# Patient Record
Sex: FEMALE | Race: WHITE | URBAN - METROPOLITAN AREA
[De-identification: names, ages, dates, MRNs, and addresses within clinical notes are randomized per-mention and may not be internally consistent; named-entity substitution may affect disease eponyms.]

---

## 2017-11-14 ENCOUNTER — TRANSFERRED RECORDS (OUTPATIENT)
Dept: HEALTH INFORMATION MANAGEMENT | Facility: CLINIC | Age: 52
End: 2017-11-14

## 2018-08-28 DIAGNOSIS — H91.90 HEARING LOSS: Primary | ICD-10-CM

## 2018-09-05 ENCOUNTER — TELEPHONE (OUTPATIENT)
Dept: OTOLARYNGOLOGY | Facility: CLINIC | Age: 53
End: 2018-09-05

## 2018-09-05 NOTE — TELEPHONE ENCOUNTER
Contacted [facility] medical records department and requested all records related to tinnitus and vertigo and any recent related imaging or audiograms. Provided fax number, will await records. BRITTANY Holland

## 2018-09-10 NOTE — TELEPHONE ENCOUNTER
FUTURE VISIT INFORMATION      FUTURE VISIT INFORMATION:    Date: 9/13/18    Time: 1:30PM (Audio)/ 2:15PM (ENT)     Location: [facility]  REFERRAL INFORMATION:    Referring provider:  SELF    Referring providers clinic:  SELF    Reason for visit/diagnosis  Tinnitus, difficulty hearing in crowds. Hx vertigo    RECORDS REQUESTED FROM:       Clinic name Comments Records Status Imaging Status   [facility]  /[facility] 11/21/17 & 11/14/17  notes with Dr Jeffrey Alexandre for Bilateral impacted cerumen (Primary Dx)    2/23/17 nurse triage encounter re: Dizzinesss Care Everywhere                                    RECORDS STATUS      *call patient re: additional records

## 2018-09-11 ENCOUNTER — HEALTH MAINTENANCE LETTER (OUTPATIENT)
Age: 53
End: 2018-09-11

## 2018-09-13 ENCOUNTER — OFFICE VISIT (OUTPATIENT)
Dept: AUDIOLOGY | Facility: CLINIC | Age: 53
End: 2018-09-13
Attending: OTOLARYNGOLOGY
Payer: COMMERCIAL

## 2018-09-13 ENCOUNTER — OFFICE VISIT (OUTPATIENT)
Dept: OTOLARYNGOLOGY | Facility: CLINIC | Age: 53
End: 2018-09-13
Attending: OTOLARYNGOLOGY
Payer: COMMERCIAL

## 2018-09-13 ENCOUNTER — PRE VISIT (OUTPATIENT)
Dept: OTOLARYNGOLOGY | Facility: CLINIC | Age: 53
End: 2018-09-13

## 2018-09-13 DIAGNOSIS — H93.13 TINNITUS OF BOTH EARS: ICD-10-CM

## 2018-09-13 DIAGNOSIS — H91.90 HEARING LOSS: ICD-10-CM

## 2018-09-13 DIAGNOSIS — H93.13 TINNITUS, BILATERAL: Primary | ICD-10-CM

## 2018-09-13 DIAGNOSIS — R42 DIZZINESS AND GIDDINESS: ICD-10-CM

## 2018-09-13 RX ORDER — NITROFURANTOIN 25; 75 MG/1; MG/1
CAPSULE ORAL
Refills: 0 | COMMUNITY
Start: 2018-09-11

## 2018-09-13 ASSESSMENT — PAIN SCALES - GENERAL: PAINLEVEL: NO PAIN (0)

## 2018-09-13 NOTE — NURSING NOTE
Chief Complaint   Patient presents with     Consult     Hearing Trouble     Federico Rogers, EMT

## 2018-09-13 NOTE — MR AVS SNAPSHOT
After Visit Summary   9/13/2018    Hawa Dotson    MRN: [id number]           Patient Information     Date Of Birth          1965        Visit Information        Provider Department      9/13/2018 1:30 PM Sulma Mendez AuD M [facility] Audiology        Today's Diagnoses     Hearing loss        Tinnitus of both ears        Dizziness and giddiness           Follow-ups after your visit        Your next 10 appointments already scheduled     Sep 13, 2018  1:30 PM CDT   Walk In From ENT with Agustin Cazares [facility] Audiology ([facility])    [address] [place] [id number] [phone]            Sep 13, 2018  2:15 PM CDT   (Arrive by 2:00 PM)   NEW NEUROTOLOGY VISIT with MD JANICE Mcgrath [facility] Ear Nose and Throat ([facility])    [address] [place] [id number] [phone]              Who to contact     Please call your clinic at [phone] to:    Ask questions about your health    Make or cancel appointments    Discuss your medicines    Learn about your test results    Speak to your doctor            Additional Information About Your Visit        [employer] Information     [employer] gives you secure access to your electronic health record. If you see a primary care provider, you can also send messages to your care team and make appointments. If you have questions, please call your primary care clinic.  If you do not have a primary care provider, please call [phone] and they will assist you.      [employer] is an electronic gateway that provides easy, online access to your medical records. With [employer], you can request a clinic appointment, read your test results, renew a prescription or communicate with your care team.     To access your existing account, please contact your [facility] or call [phone] for assistance.        Care EveryWhere ID     This is  your Care EveryWhere ID. This could be used by other organizations to access your [facility] medical records  [id number]         Blood Pressure from Last 3 Encounters:   No data found for BP    Weight from Last 3 Encounters:   No data found for Wt              We Performed the Following     AUDIOGRAM/TYMPANOGRAM - INTERFACE     AUDIOLOGY ADULT REFERRAL     [facility] Eval [facility] Speech Recog ([address]658)     Tymps / Reflex   ([address])        Primary Care Provider Office Phone # Fax #    Shayy Godinez [phone]       [facility] [address]  [facility] [address]        Equal Access to Services     [facility]: Hadii aad ku hadasho Soomaali, waaxda luqadaha, qaybta kaalmada adeegyada, waxay idiin hayaan adeeg trung rivers . So [facility] [phone].    ATENCIÓN: Si habla español, tiene a summers disposición servicios gratuitos de asistencia lingüística. Llame al [phone].    We comply with applicable federal civil rights laws and [place] laws. We do not discriminate on the basis of race, color, national origin, age, disability, sex, sexual orientation, or gender identity.            Thank you!     Thank you for choosing [facility] AUDIOLOGY  for your care. Our goal is always to provide you with excellent care. Hearing back from our patients is one way we can continue to improve our services. Please take a few minutes to complete the written survey that you may receive in the mail after your visit with us. Thank you!             Your Updated Medication List - Protect others around you: Learn how to safely use, store and throw away your medicines at www.disposemymeds.org.      Notice  As of 9/13/2018  1:29 PM    You have not been prescribed any medications.

## 2018-09-13 NOTE — PROGRESS NOTES
Hawa Dotson is a new patient to our clinic.  She is a 53 year old female being seen for bilateral tinnitus.  She reports that she has had tinnitus for years and the left might be louder than the right.  It is not particularly bothersome but she has a family history of hearing loss and wanted to check that she was not also having hearing loss.  She has noticed that she finds it more difficult to hear in noise.  She otherwise has no history of recurrent ear infections and currently has no otalgia or otorrhea.  She has had BPPV in the past and is not having vertigo right now but does sometimes have a little imbalance.  Nothing where she has fallen or has a difficult time doing her daily activities.    Past Medical History:   Diagnosis Date     Benign positional vertigo 2017    Changing position of head (standing and/or lying down)     Conductive hearing loss 2010?    Hereditary inner ear condition; maternal; required surgery     Hearing problem 2010    Background noise bothersome; talk loud; delayed response     Tinnitus 2010?    Constant power line hum in ears; has increased over time       Past Surgical History:   Procedure Laterality Date     GI SURGERY  2012    removal of left ovary; removal of bladder polyps     ORTHOPEDIC SURGERY  2012 & 2017    bunion surgeries; both feet     TYMPANOPLASTY      Mom had inner ear surgery; hereditary to daughters       FHx:  Her father had a BAHA for unilateral sensorineural hearing loss and her mother had middle ear surgery for conductive hearing loss.    Social History   Substance Use Topics     Smoking status: Never Smoker     Smokeless tobacco: Never Used     Alcohol use No       Patient Supplied Answers to Review of Systems  [facility] ROS 9/11/2018   Constitutional Problems with sleep   Neurology Dizzy spells   Ears, Nose, Throat Hearing loss, Ringing/noise in ears, Trouble swallowing   Gastrointestinal/Genitourinary Pain with urination   Endocrine Frequent urination   The  remainder of the 10 point review of systems is otherwise negative.    Physical examination:  Constitutional:  In no acute distress, appears stated age  Eyes:  Extraocular movements intact, no spontaneous nystagmus  Ears:  Both ears examined.  Ear canals clear, TMs intact with well aerated middle ears.  Respiratory:  No increased work of breathing, wheezing or stridor  Musculoskeletal:  Good upper extremity strength  Skin:  No rashes on the head and neck  Neurologic:  House Brackman 1/6 bilaterally, ambulating normally  Psychiatric:  Alert, normal affect, answering questions appropriately    Audiogram:  Normal hearing bilaterally with excellent speech discrimination, normal tympanograms.    Outside record was reviewed including audiogram which also showed normal hearing bilaterally.    Assessment and plan:  Bilateral tinnitus with excellent hearing bilaterally.  We went over the central etiology of tinnitus as well as masking techniques.  She was pleased that her ear exam was normal and her hearing was normal.  She is not terribly bothered by the tinnitus and had her questions answered.

## 2018-09-13 NOTE — PATIENT INSTRUCTIONS
You will need  to schedule a follow up appointment as needed.       Please call our clinic for any questions,concerns,or worsening symptoms.      Clinic #[phone]       Option 3  for the ENT Care Team.       Option 1 for scheduling.    Anusha BOSTON RNCC  [phone]

## 2018-09-13 NOTE — PROGRESS NOTES
AUDIOLOGY REPORT    SUMMARY: Audiology visit completed. See audiogram for results.      RECOMMENDATIONS: Follow-up with ENT.      Carl Allred, CCC-A  Licensed Audiologist  [place] #[id number]

## 2018-09-13 NOTE — LETTER
9/13/2018      RE: Hawa Dotson  [address] [facility] [address]       Hawa Dotson is a new patient to our clinic.  She is a 53 year old female being seen for bilateral tinnitus.  She reports that she has had tinnitus for years and the left might be louder than the right.  It is not particularly bothersome but she has a family history of hearing loss and wanted to check that she was not also having hearing loss.  She has noticed that she finds it more difficult to hear in noise.  She otherwise has no history of recurrent ear infections and currently has no otalgia or otorrhea.  She has had BPPV in the past and is not having vertigo right now but does sometimes have a little imbalance.  Nothing where she has fallen or has a difficult time doing her daily activities.    Past Medical History:   Diagnosis Date     Benign positional vertigo 2017    Changing position of head (standing and/or lying down)     Conductive hearing loss 2010?    Hereditary inner ear condition; maternal; required surgery     Hearing problem 2010    Background noise bothersome; talk loud; delayed response     Tinnitus 2010?    Constant power line hum in ears; has increased over time       Past Surgical History:   Procedure Laterality Date     GI SURGERY  2012    removal of left ovary; removal of bladder polyps     ORTHOPEDIC SURGERY  2012 & 2017    bunion surgeries; both feet     TYMPANOPLASTY      Mom had inner ear surgery; hereditary to daughters       FHx:  Her father had a BAHA for unilateral sensorineural hearing loss and her mother had middle ear surgery for conductive hearing loss.    Social History   Substance Use Topics     Smoking status: Never Smoker     Smokeless tobacco: Never Used     Alcohol use No       Physical examination:  Constitutional:  In no acute distress, appears stated age  Eyes:  Extraocular movements intact, no spontaneous nystagmus  Ears:  Both ears examined.  Ear canals clear, TMs intact with well aerated  middle ears.  Respiratory:  No increased work of breathing, wheezing or stridor  Musculoskeletal:  Good upper extremity strength  Skin:  No rashes on the head and neck  Neurologic:  House Brackman 1/6 bilaterally, ambulating normally  Psychiatric:  Alert, normal affect, answering questions appropriately    Audiogram:  Normal hearing bilaterally with excellent speech discrimination, normal tympanograms.    Outside record was reviewed including audiogram which also showed normal hearing bilaterally.    Assessment and plan:  Bilateral tinnitus with excellent hearing bilaterally.  We went over the central etiology of tinnitus as well as masking techniques.  She was pleased that her ear exam was normal and her hearing was normal.  She is not terribly bothered by the tinnitus and had her questions answered.    Carolina Stein MD

## 2018-09-13 NOTE — MR AVS SNAPSHOT
After Visit Summary   9/13/2018    Hawa Dotson    MRN: [id number]           Patient Information     Date Of Birth          1965        Visit Information        Provider Department      9/13/2018 2:15 PM Carolina Stein MD [facility] Ear Nose and Throat        Today's Diagnoses     Tinnitus, bilateral    -  1      Care Instructions    You will need  to schedule a follow up appointment as needed.       Please call our clinic for any questions,concerns,or worsening symptoms.      Clinic #[phone]       Option 3  for the ENT Care Team.       Option 1 for scheduling.    Anusha BOSTON RNCC  [phone]            Follow-ups after your visit        Follow-up notes from your care team     Return if symptoms worsen or fail to improve.      Who to contact     Please call your clinic at [phone] to:    Ask questions about your health    Make or cancel appointments    Discuss your medicines    Learn about your test results    Speak to your doctor            Additional Information About Your Visit        [employer] Information     [email or web address] gives you secure access to your electronic health record. If you see a primary care provider, you can also send messages to your care team and make appointments. If you have questions, please call your primary care clinic.  If you do not have a primary care provider, please call [phone] and they will assist you.      [employer] is an electronic gateway that provides easy, online access to your medical records. With [employer], you can request a clinic appointment, read your test results, renew a prescription or communicate with your care team.     To access your existing account, please contact your [facility] or call [phone] for assistance.        Care EveryWhere ID     This is your Care EveryWhere ID. This could be used by other organizations to access your [facility] medical records  [id number]         Blood Pressure from Last 3 Encounters:    No data found for BP    Weight from Last 3 Encounters:   No data found for Wt              Today, you had the following     No orders found for display       Primary Care Provider Office Phone # Fax #    Shayy Godinez [phone]       [facility] [address]  [facility] [address]        Equal Access to Services     ISAAC BEARD : Hadii aad ku hadasho Soomaali, waaxda luqadaha, qaybta kaalmada adeegyada, waxay idiin hayaan adevlad tamikoshilpi lagodwin . So [facility] [phone].    ATENCIÓN: Si habla español, tiene a summers disposición servicios gratuitos de asistencia lingüística. Llame al [phone].    We comply with applicable federal civil rights laws and [place] laws. We do not discriminate on the basis of race, color, national origin, age, disability, sex, sexual orientation, or gender identity.            Thank you!     Thank you for choosing [facility] EAR NOSE AND THROAT  for your care. Our goal is always to provide you with excellent care. Hearing back from our patients is one way we can continue to improve our services. Please take a few minutes to complete the written survey that you may receive in the mail after your visit with us. Thank you!             Your Updated Medication List - Protect others around you: Learn how to safely use, store and throw away your medicines at www.disposemymeds.org.          This list is accurate as of 9/13/18 11:59 PM.  Always use your most recent med list.                   Brand Name Dispense Instructions for use Diagnosis    nitroFURantoin (macrocrystal-monohydrate) 100 MG capsule    MACROBID     TAKE 1 CAPSULE (100 MG TOTAL) BY MOUTH EVERY 12 (TWELVE) HOURS FOR 7 DAYS.

## 2020-03-10 ENCOUNTER — HEALTH MAINTENANCE LETTER (OUTPATIENT)
Age: 55
End: 2020-03-10

## 2020-12-27 ENCOUNTER — HEALTH MAINTENANCE LETTER (OUTPATIENT)
Age: 55
End: 2020-12-27

## 2021-04-24 ENCOUNTER — HEALTH MAINTENANCE LETTER (OUTPATIENT)
Age: 56
End: 2021-04-24

## 2021-10-04 ENCOUNTER — HEALTH MAINTENANCE LETTER (OUTPATIENT)
Age: 56
End: 2021-10-04

## 2022-03-20 ENCOUNTER — HEALTH MAINTENANCE LETTER (OUTPATIENT)
Age: 57
End: 2022-03-20

## 2022-05-15 ENCOUNTER — HEALTH MAINTENANCE LETTER (OUTPATIENT)
Age: 57
End: 2022-05-15

## 2022-09-11 ENCOUNTER — HEALTH MAINTENANCE LETTER (OUTPATIENT)
Age: 57
End: 2022-09-11

## 2023-06-03 ENCOUNTER — HEALTH MAINTENANCE LETTER (OUTPATIENT)
Age: 58
End: 2023-06-03